# Patient Record
(demographics unavailable — no encounter records)

---

## 2025-04-23 NOTE — PROCEDURE
[FreeTextEntry1] : R temporal artery biopsy [FreeTextEntry2] : r/o GCA [FreeTextEntry3] :  The patient was met in the pre-procedure holding area of the office where informed consent was obtained and verified. He then entered the procedure room and laid in the supine position. All pressure points were appropriately padded. Her hair was carefully clipped and cleaned away off the temporal artery. The right temporal artery was palpable and marked with a sterile marking pen. The patient was then prepped and draped in the usual sterile fashion. A time-out was performed according to standard parameters. 1% plain lidocaine was injected over the right temporal artery.. A 2cm incision was then made directly over the right temporal artery. Skin flaps were created on both sides of the incision. The wound was deepened through the subcutaneous tissue using electrocautery. A self-retaining retractor was placed within the incision. The fascia over the right temporal artery was sharply divided. The right temporal artery was then circumferentially dissected free from surrounding tissue and completely mobilized. It was then ligated proximally and distally. Its small branches were also ligated and then the artery was divided adjacent to all ties. It was placed within a specimen cup with formalin and sent as permanent specimen. Hemostasis was then achieved with Bovie electrocautery. The wound was then closed in two layers using a 3-0 Vicryl through the deep dermis and a 4-0 Monocryl in the subcuticular layer. A sterile dressing was applied. All instrument and sponge counts were correct. I was present and performed every aspect of this operation.

## 2025-04-30 NOTE — ASSESSMENT
[FreeTextEntry1] : Impression - Pt is s/p right temporal artery biopsy 4/23/2025. Biopsy is negative for GCA. Incision is healing appropriately with minimal serosanguineous drainage. Incision does not appear to be infected   Plan Sutures removed and steri strips applied Advised to keep steri strips on until they fall off or remove in 5-7 days Can apply dry gauze over steri strips if draining a lot Advised son to contact the office if there's worsening incisional pain, redness, or drainage Otherwise, return to office as needed [Arterial/Venous Disease] : arterial/venous disease

## 2025-04-30 NOTE — REVIEW OF SYSTEMS
[Eyesight Problems] : eyesight problems [As Noted in HPI] : as noted in HPI [Negative] : Heme/Lymph [de-identified] : tearing/ anxious

## 2025-04-30 NOTE — PHYSICAL EXAM
[General Appearance - Alert] : alert [General Appearance - Well Developed] : well developed [Respiration, Rhythm And Depth] : normal respiratory rhythm and effort [Auscultation Breath Sounds / Voice Sounds] : lungs were clear to auscultation bilaterally [Heart Rate And Rhythm] : heart rate was normal and rhythm regular [FreeTextEntry1] : irregular heart beat/ [Edema] : there was no peripheral edema [Abdomen Soft] : soft [Abdomen Tenderness] : non-tender [] : no rash [No Focal Deficits] : no focal deficits [Oriented To Time, Place, And Person] : oriented to person, place, and time [Impaired Insight] : insight and judgment were intact

## 2025-04-30 NOTE — REASON FOR VISIT
[Negative] : Allergic/Immunologic
[Post Hospitalization] : a post hospitalization visit
[Post Hospitalization] : a post hospitalization visit

## 2025-04-30 NOTE — HISTORY OF PRESENT ILLNESS
[FreeTextEntry1] : 77F PMH HTN, DM presenting with acute R eye vision loss found to have CRAO and elevated inflammatory markers. The patient was seen by rheumatology during admission to [facility] and this is hospital fu visit for high suspicion for GCA  ## High suspicion GCA  = CRAO R eye with acute R eye vision loss #elevated CRP = Pt with sudden onset R eye vision loss, found to have cherry red spot on optho exam c/w CRAO. Had chronic frontal HA for past year, and chronic neck/shoulder stiffness. No jaw claudication or scalp tenderness = ESR was normal- 36, CRP elevated 68 = CTH and neck angio does not show occlusions or stenosis, MR brain and orbits normal. US temporal artery without halo sign = Given CRAO, elevated CRP, PMR symptoms, chronic HA, pt has high suspicion for GCA, has high score on Southend GCA probability score = S/p solumedrol 1g 4/16-4/17, without improvement of vision, but full resolution of HA = completed solumedrol 1g qd for total three days (4/16-4/18) and switched to prednisone 50mg qd starting 4/19   = d/c also on pantoprazole and Bactrim DS three times a week = Right temporal artery biopsy 4/23/2025: Artery with moderate atherosclerotic change. Trichrome and elastic stains are performed on block 1A and are negative for temporal arteritis.   ## During hospital admission the patient was found have hep B core ab positive, f/u Hep B DNA and quant  [id number]   Currently : ============= on prendisone 50 mg a day no headache, has left side jaw pain on opening mouth but no claudication vision in right eye has not improved saw ophthalmologist after d/c and was told that may have glaucoma - left eye no neck or muscle pain mild mid back pain

## 2025-04-30 NOTE — HISTORY OF PRESENT ILLNESS
[FreeTextEntry1] : 76 yo female is s/p right temporal artery biopsy 4/23/25. Incision healing appropriately. Reports minimal light red/brown drainage. Denies incisional pain, redness or warmth. Biopsy is negative for temporal arteritis. Has follow up with rheumatology later today. Pt still has right eye vision loss, headache, and neck and shoulder stiffness. She is taking prednisone.

## 2025-04-30 NOTE — REVIEW OF SYSTEMS
[Eyesight Problems] : eyesight problems [As Noted in HPI] : as noted in HPI [Negative] : Heme/Lymph [de-identified] : tearing/ anxious

## 2025-04-30 NOTE — ASSESSMENT
[FreeTextEntry1] : GCA (giant cell arteritis) high level of suspicion temporal artery bx negative for temporal arteritis On high dose prednisone  50 mg a day - Need for pneumocystis prophylaxis   right eye vision very low - has difficulty with adjusting to vision change ? new diagnosis of glaucoma ?? left eye Hepatis B core ab r/o Osteoporosis  = labs = CTA chest and  abdomen to r/o large vessel vasculitis = DEXA to evaluate for osteoporosis = continue prendisone 40 mg a day x 2 weeks, 30 mg a day x 2 weeks, 20 mg a day = conotnue Bactrim DS TIW  = Ca and VIt D = neuropopthalmology evaluation and fu = hepatilogy evaluation because of hepatitis B ab and needs to start Actemra - ? need for antiviral tx  RTO 1 month

## 2025-04-30 NOTE — PHYSICAL EXAM
[Alert] : alert [Oriented to Person] : oriented to person [Oriented to Place] : oriented to place [Oriented to Time] : oriented to time [Calm] : calm [de-identified] : NAD [de-identified] : unlabored breathing [FreeTextEntry1] : right temporal incision healing appropriately minimal serosanguineous drainage mild nehal incisional edema no erythema or warmth

## 2025-04-30 NOTE — HISTORY OF PRESENT ILLNESS
[FreeTextEntry1] : 77F PMH HTN, DM presenting with acute R eye vision loss found to have CRAO and elevated inflammatory markers. The patient was seen by rheumatology during admission to [facility] and this is hospital fu visit for high suspicion for GCA  ## High suspicion GCA  = CRAO R eye with acute R eye vision loss #elevated CRP = Pt with sudden onset R eye vision loss, found to have cherry red spot on optho exam c/w CRAO. Had chronic frontal HA for past year, and chronic neck/shoulder stiffness. No jaw claudication or scalp tenderness = ESR was normal- 36, CRP elevated 68 = CTH and neck angio does not show occlusions or stenosis, MR brain and orbits normal. US temporal artery without halo sign = Given CRAO, elevated CRP, PMR symptoms, chronic HA, pt has high suspicion for GCA, has high score on Southend GCA probability score = S/p solumedrol 1g 4/16-4/17, without improvement of vision, but full resolution of HA = completed solumedrol 1g qd for total three days (4/16-4/18) and switched to prednisone 50mg qd starting 4/19   = d/c also on pantoprazole and Bactrim DS three times a week = Right temporal artery biopsy 4/23/2025: Artery with moderate atherosclerotic change. Trichrome and elastic stains are performed on block 1A and are negative for temporal arteritis.   ## During hospital admission the patient was found have hep B core ab positive, f/u Hep B DNA and quant  [id number]   Currently : ============= on prendisone 50 mg a day no headache, has left side jaw pain on opening mouth but no claudication vision in right eye has not improved saw ophthalmologist after d/c and was told that may have glaucoma - left eye no neck or muscle pain mild mid back pain

## 2025-06-26 NOTE — HISTORY OF PRESENT ILLNESS
[de-identified] : last was seen in April, 2025 [FreeTextEntry1] : Interval history ============= on prednisone 15 mg a day x 9 days has been depressed and started seeing psychologist and psychiatrist no headache, + left side jaw pain on opening mouth but no claudication right arm feels tingly and numb on and off vision in right eye is completely gone, no shadows, no change since last visit saw ophthalmologist 2 weeks ago, ? if has glaucoma in left eye - saw neuro-ophthalmologist / and retinal specialist no neck or muscle pain, but has neck crackling mild mid back pain

## 2025-06-26 NOTE — REVIEW OF SYSTEMS
[Eyesight Problems] : eyesight problems [As Noted in HPI] : as noted in HPI [Negative] : Heme/Lymph [de-identified] : tearing/ anxious

## 2025-06-26 NOTE — PHYSICAL EXAM
[General Appearance - Alert] : alert [General Appearance - Well Developed] : well developed [Respiration, Rhythm And Depth] : normal respiratory rhythm and effort [Auscultation Breath Sounds / Voice Sounds] : lungs were clear to auscultation bilaterally [Heart Rate And Rhythm] : heart rate was normal and rhythm regular [Edema] : there was no peripheral edema [Abdomen Soft] : soft [Abdomen Tenderness] : non-tender [No Focal Deficits] : no focal deficits [] : no rash [Impaired Insight] : insight and judgment were intact [Oriented To Time, Place, And Person] : oriented to person, place, and time [FreeTextEntry1] : irregular heart beat/

## 2025-06-26 NOTE — PHYSICAL EXAM
[General Appearance - Alert] : alert [General Appearance - Well Developed] : well developed [Respiration, Rhythm And Depth] : normal respiratory rhythm and effort [Auscultation Breath Sounds / Voice Sounds] : lungs were clear to auscultation bilaterally [Heart Rate And Rhythm] : heart rate was normal and rhythm regular [Edema] : there was no peripheral edema [Abdomen Soft] : soft [Abdomen Tenderness] : non-tender [No Focal Deficits] : no focal deficits [] : no rash [Oriented To Time, Place, And Person] : oriented to person, place, and time [Impaired Insight] : insight and judgment were intact [FreeTextEntry1] : irregular heart beat/

## 2025-06-26 NOTE — CONSULT LETTER
[Dear  ___] : Dear  [unfilled], [Consult Letter:] : I had the pleasure of evaluating your patient, [unfilled]. [Please see my note below.] : Please see my note below. [Consult Closing:] : Thank you very much for allowing me to participate in the care of this patient.  If you have any questions, please do not hesitate to contact me. [Sincerely,] : Sincerely, [FreeTextEntry3] : Corina Chambers MD Director, [facility],  Rheumatology Division, Department of Medicine ,  [employer]  at [address], [address] [place] [address] Tel: [phone]

## 2025-06-26 NOTE — HISTORY OF PRESENT ILLNESS
[de-identified] : last was seen in April, 2025 [FreeTextEntry1] : Interval history ============= on prednisone 15 mg a day x 9 days has been depressed and started seeing psychologist and psychiatrist no headache, + left side jaw pain on opening mouth but no claudication right arm feels tingly and numb on and off vision in right eye is completely gone, no shadows, no change since last visit saw ophthalmologist 2 weeks ago, ? if has glaucoma in left eye - saw neuro-ophthalmologist / and retinal specialist no neck or muscle pain, but has neck crackling mild mid back pain

## 2025-06-26 NOTE — CONSULT LETTER
[Dear  ___] : Dear  [unfilled], [Consult Letter:] : I had the pleasure of evaluating your patient, [unfilled]. [Please see my note below.] : Please see my note below. [Consult Closing:] : Thank you very much for allowing me to participate in the care of this patient.  If you have any questions, please do not hesitate to contact me. [Sincerely,] : Sincerely, [FreeTextEntry3] : Corina Chambers MD Director, [facility],  Rheumatology Division, Department of Medicine ,  [employer]  at [address], [address] [place] [address] Tel: [phone]

## 2025-06-26 NOTE — ASSESSMENT
[FreeTextEntry1] : GCA (giant cell arteritis) high level of suspicion = temporal artery bx negative for temporal arteritis = CTA chest and  abdomen  with no evidence of large vessel vasculitis On prednisone 15 mg a day   loss vision in right eye vision   ? new diagnosis of glaucoma ?? left eye - will need a second opinion Hepatis B core ab Osteoporosis - DEXA results reviewed - mostly osteopenia Hyponatriemia  and transaminitis  =  labs were done and reviewed =  MRI C spine to evaluate for spondylosis =  lower prendisone 10 mg a day x 10 days, 7.5 mg a day x 10 days, 5 mg a day x 10 days, 2.5 mg a day x 10 days, - 2.5 mg every other day x 10 days =  d/c  Bactrim DS TIW and fu labs after  = Ca and VIt D =  worsening of transaminitis / add heptitis serology and hepatilogy evaluation because of hepatitis B ab  - ? need for antiviral tx  RTO  2 weeks

## 2025-06-26 NOTE — REVIEW OF SYSTEMS
[Eyesight Problems] : eyesight problems [As Noted in HPI] : as noted in HPI [Negative] : Heme/Lymph [de-identified] : tearing/ anxious